# Patient Record
Sex: FEMALE | Race: WHITE
[De-identification: names, ages, dates, MRNs, and addresses within clinical notes are randomized per-mention and may not be internally consistent; named-entity substitution may affect disease eponyms.]

---

## 2017-05-23 ENCOUNTER — RESULT REVIEW (OUTPATIENT)
Age: 57
End: 2017-05-23

## 2018-05-24 ENCOUNTER — RESULT REVIEW (OUTPATIENT)
Age: 58
End: 2018-05-24

## 2019-06-03 ENCOUNTER — RECORD ABSTRACTING (OUTPATIENT)
Age: 59
End: 2019-06-03

## 2019-06-03 DIAGNOSIS — O02.1 MISSED ABORTION: ICD-10-CM

## 2019-06-03 DIAGNOSIS — Z92.89 PERSONAL HISTORY OF OTHER MEDICAL TREATMENT: ICD-10-CM

## 2019-06-03 DIAGNOSIS — Z87.891 PERSONAL HISTORY OF NICOTINE DEPENDENCE: ICD-10-CM

## 2019-06-03 DIAGNOSIS — E78.00 PURE HYPERCHOLESTEROLEMIA, UNSPECIFIED: ICD-10-CM

## 2019-06-03 DIAGNOSIS — N96 RECURRENT PREGNANCY LOSS: ICD-10-CM

## 2019-06-03 DIAGNOSIS — E16.2 HYPOGLYCEMIA, UNSPECIFIED: ICD-10-CM

## 2019-06-03 DIAGNOSIS — Z82.49 FAMILY HISTORY OF ISCHEMIC HEART DISEASE AND OTHER DISEASES OF THE CIRCULATORY SYSTEM: ICD-10-CM

## 2019-06-03 DIAGNOSIS — M85.80 OTHER SPECIFIED DISORDERS OF BONE DENSITY AND STRUCTURE, UNSPECIFIED SITE: ICD-10-CM

## 2019-06-03 LAB — CYTOLOGY CVX/VAG DOC THIN PREP: NORMAL

## 2019-06-03 RX ORDER — SIMVASTATIN 40 MG/1
40 TABLET, FILM COATED ORAL
Refills: 0 | Status: ACTIVE | COMMUNITY

## 2019-06-03 RX ORDER — CHLORPHENIRAMINE/PHENYLPROPAN 8 MG-75 MG
125 MCG CAPSULE, EXTENDED RELEASE ORAL
Refills: 0 | Status: ACTIVE | COMMUNITY

## 2019-06-03 RX ORDER — UBIDECARENONE 100 MG
100 CAPSULE ORAL
Refills: 0 | Status: ACTIVE | COMMUNITY

## 2019-06-04 ENCOUNTER — APPOINTMENT (OUTPATIENT)
Dept: OBGYN | Facility: CLINIC | Age: 59
End: 2019-06-04
Payer: COMMERCIAL

## 2019-06-04 VITALS
BODY MASS INDEX: 23 KG/M2 | HEIGHT: 62 IN | SYSTOLIC BLOOD PRESSURE: 120 MMHG | WEIGHT: 125 LBS | DIASTOLIC BLOOD PRESSURE: 70 MMHG

## 2019-06-04 DIAGNOSIS — Z12.4 ENCOUNTER FOR SCREENING FOR MALIGNANT NEOPLASM OF CERVIX: ICD-10-CM

## 2019-06-04 DIAGNOSIS — Z72.3 LACK OF PHYSICAL EXERCISE: ICD-10-CM

## 2019-06-04 DIAGNOSIS — Z01.419 ENCOUNTER FOR GYNECOLOGICAL EXAMINATION (GENERAL) (ROUTINE) W/OUT ABNORMAL FINDINGS: ICD-10-CM

## 2019-06-04 LAB
BILIRUB UR QL STRIP: NORMAL
COLLECTION METHOD: NORMAL
DATE COLLECTED: NORMAL
GLUCOSE UR-MCNC: NORMAL
HCG UR QL: 0.2 EU/DL
HEMOCCULT SP1 STL QL: NEGATIVE
HGB UR QL STRIP.AUTO: NORMAL
KETONES UR-MCNC: NORMAL
LEUKOCYTE ESTERASE UR QL STRIP: NORMAL
NITRITE UR QL STRIP: NORMAL
PH UR STRIP: 8.5
PROT UR STRIP-MCNC: NORMAL
QUALITY CONTROL: YES
SP GR UR STRIP: 1.02

## 2019-06-04 PROCEDURE — 81003 URINALYSIS AUTO W/O SCOPE: CPT | Mod: QW

## 2019-06-04 PROCEDURE — 99396 PREV VISIT EST AGE 40-64: CPT

## 2019-06-04 PROCEDURE — 82270 OCCULT BLOOD FECES: CPT

## 2019-06-04 RX ORDER — AMOXICILLIN 500 MG/1
500 CAPSULE ORAL
Qty: 21 | Refills: 0 | Status: DISCONTINUED | COMMUNITY
Start: 2019-05-20 | End: 2019-06-04

## 2019-06-04 RX ORDER — FLUTICASONE PROPIONATE 50 UG/1
50 SPRAY, METERED NASAL
Qty: 16 | Refills: 0 | Status: DISCONTINUED | COMMUNITY
Start: 2019-01-02 | End: 2019-06-04

## 2019-06-04 NOTE — HISTORY OF PRESENT ILLNESS
[1 Year Ago] : 1 year ago [Good] : being in good health [Healthy Diet] : a healthy diet [Regular Exercise] : regular exercise [Last Pap Smear ___] : last Papanicolaou cytology done [unfilled] [Performed: ___] : DEXA performed [unfilled] [Last Mammogram ___] : last mammogram done [unfilled] [Postmenopausal] : is postmenopausal [Menarche Age: ____] : age at menarche was [unfilled] [Monogamous] : is monogamous [Male ___] : [unfilled] male [Pregnancy History] : pregnancy history: [Total Preg ___] : [unfilled] [Full Term ___] : [unfilled] [Living ___] : [unfilled] [AB Spont ___] : miscarriages: [unfilled] [unknown] : the patient is unsure of the date of her LMP [Post-Menopause, No Sxs] : post-menopausal, currently without symptoms [No] : no [Last HPV Screen ___] : last human papilloma virus screening done [unfilled] [Colonoscopy Within 10 Years] : a colonoscopy within the past ten years [Mood Changes] : mood changes [Weight Concerns] : no concerns with her weight [Fever] : no fever [de-identified] : Breast ultrasound 7/20/2018 BI-RADS 1  [Diarrhea] : no diarrhea [Vomiting] : no vomiting [Nausea] : no nausea [Pelvic Pressure] : no pelvic pressure [Vaginal Discharge] : no vaginal discharge [Vaginal Bleeding] : no vaginal bleeding [Dysmenorrhea] : no dysmenorrhea [Dysuria] : no dysuria [Pain with BMs] : no pain with bowel movements [Burning] : no burning [Itching] : no itching [Mass] : no mass [Stinging] : no stinging [Lesion] : no lesion [Soreness] : no soreness [Localized Pain] : no localized pain [Discharge] : no discharge [Mass (___cm)] : no palpable mass [Diffused Pain] : no diffused pain [Skin Color Change] : no skin color change [Hot Flashes] : no hot flashes [Nipple Discharge] : no nipple discharge [Night Sweats] : no night sweats [Vaginal Itching] : no vaginal itching [Dyspareunia] : no dyspareunia [Sexually Active] : is not sexually active

## 2019-06-04 NOTE — PROCEDURE
[Cervical Pap Smear] : cervical Pap smear [Tolerated Well] : the patient tolerated the procedure well [No Complications] : there were no complications [Liquid Base] : liquid base

## 2019-06-04 NOTE — DISCUSSION/SUMMARY
[FreeTextEntry1] : 59 year old postmenopausal  5 para 3023 is here for an annual exam. \par  \par Ms. ALCANTAR's mammogram from 2018 revealed BI-RADS 2 of the right breast and BI-RADS 1 of the left breast.  Continued monthly self breast examination was advised and a referral for mammogram was provided. \par  \par The importance of weight bearing exercise and adequate daily calcium with vitamin D3 to slow the progression of bone loss was underscored. \par  \par All of her concerns were addressed, questions answered and reassurance was given.

## 2019-06-04 NOTE — PHYSICAL EXAM
[Alert] : alert [Awake] : awake [Soft] : soft [Oriented x3] : oriented to person, place, and time [No Lesions] : no genitalia lesions [Labia Majora] : labia major [Labia Minora] : labia minora [Normal Position] : in a normal position [No Bleeding] : there was no active vaginal bleeding [Normal] : clitoris [No Tenderness] : no rectal tenderness [Uterine Adnexae] : were not tender and not enlarged [Nl Sphincter Tone] : normal sphincter tone [RRR, No Murmurs] : RRR, no murmurs [Vulvar Atrophy] : vulvar atrophy [CTAB] : CTAB [Atrophy] : atrophy [Acute Distress] : no acute distress [LAD] : no lymphadenopathy [Thyroid Nodule] : no thyroid nodule [Mass] : no breast mass [Goiter] : no goiter [Nipple Discharge] : no nipple discharge [Axillary LAD] : no axillary lymphadenopathy [Distended] : not distended [Tender] : non tender [Depressed Mood] : not depressed [H/Smegaly] : no hepatosplenomegaly [Flat Affect] : affect not flat [Occult Blood] : occult blood test from digital rectal exam was negative

## 2019-06-05 LAB — HPV HIGH+LOW RISK DNA PNL CVX: NOT DETECTED

## 2019-06-10 LAB — CYTOLOGY CVX/VAG DOC THIN PREP: NORMAL

## 2020-04-04 ENCOUNTER — TRANSCRIPTION ENCOUNTER (OUTPATIENT)
Age: 60
End: 2020-04-04

## 2020-12-21 PROBLEM — Z01.419 ENCOUNTER FOR ANNUAL ROUTINE GYNECOLOGICAL EXAMINATION: Status: RESOLVED | Noted: 2019-06-04 | Resolved: 2020-12-21

## 2022-11-15 ENCOUNTER — OFFICE (OUTPATIENT)
Dept: URBAN - METROPOLITAN AREA CLINIC 102 | Facility: CLINIC | Age: 62
Setting detail: OPHTHALMOLOGY
End: 2022-11-15
Payer: COMMERCIAL

## 2022-11-15 DIAGNOSIS — H25.13: ICD-10-CM

## 2022-11-15 DIAGNOSIS — H40.033: ICD-10-CM

## 2022-11-15 DIAGNOSIS — H43.812: ICD-10-CM

## 2022-11-15 DIAGNOSIS — H90.3: ICD-10-CM

## 2022-11-15 DIAGNOSIS — H43.391: ICD-10-CM

## 2022-11-15 DIAGNOSIS — H10.45: ICD-10-CM

## 2022-11-15 PROCEDURE — 92557 COMPREHENSIVE HEARING TEST: CPT | Performed by: AUDIOLOGIST

## 2022-11-15 PROCEDURE — 92567 TYMPANOMETRY: CPT | Performed by: AUDIOLOGIST

## 2022-11-15 PROCEDURE — V5261 HEARING AID, DIGIT, BIN, BTE: HCPCS | Performed by: AUDIOLOGIST

## 2022-11-15 PROCEDURE — 92020 GONIOSCOPY: CPT | Performed by: OPHTHALMOLOGY

## 2022-11-15 PROCEDURE — 92014 COMPRE OPH EXAM EST PT 1/>: CPT | Performed by: OPHTHALMOLOGY

## 2022-11-15 ASSESSMENT — REFRACTION_CURRENTRX
OS_OVR_VA: 20/
OS_OVR_VA: 20/
OD_SPHERE: -4.00
OS_AXIS: 086
OS_ADD: +1.75
OS_SPHERE: -1.50
OS_CYLINDER: -0.50
OS_VPRISM_DIRECTION: PROGS
OD_SPHERE: -2.75
OS_ADD: +1.50
OD_CYLINDER: -0.50
OD_ADD: +1.50
OS_SPHERE: -3.50
OD_AXIS: 030
OS_CYLINDER: -1.25
OD_ADD: +1.75
OD_CYLINDER: -0.75
OS_AXIS: 105
OD_OVR_VA: 20/
OD_AXIS: 056
OD_OVR_VA: 20/
OD_VPRISM_DIRECTION: PROGS

## 2022-11-15 ASSESSMENT — AXIALLENGTH_DERIVED
OS_AL: 23.77
OD_AL: 24.2631

## 2022-11-15 ASSESSMENT — VISUAL ACUITY
OS_BCVA: 20/25
OD_BCVA: 20/25

## 2022-11-15 ASSESSMENT — KERATOMETRY
OD_K2POWER_DIOPTERS: 44.50
OS_AXISANGLE_DEGREES: 018
OD_K1POWER_DIOPTERS: 44.25
OD_AXISANGLE_DEGREES: 100
OS_K1POWER_DIOPTERS: 44.75
OS_K2POWER_DIOPTERS: 45.00

## 2022-11-15 ASSESSMENT — TONOMETRY
OS_IOP_MMHG: 14
OD_IOP_MMHG: 13

## 2022-11-15 ASSESSMENT — SPHEQUIV_DERIVED
OS_SPHEQUIV: -1.75
OD_SPHEQUIV: -2.5

## 2022-11-15 ASSESSMENT — REFRACTION_AUTOREFRACTION
OS_SPHERE: -1.25
OD_CYLINDER: -0.50
OD_SPHERE: -2.25
OS_CYLINDER: -1.00
OD_AXIS: 068
OS_AXIS: 104

## 2022-11-15 ASSESSMENT — CONFRONTATIONAL VISUAL FIELD TEST (CVF)
OS_FINDINGS: FULL
OD_FINDINGS: FULL

## 2022-12-23 ENCOUNTER — OFFICE (OUTPATIENT)
Dept: URBAN - METROPOLITAN AREA CLINIC 102 | Facility: CLINIC | Age: 62
Setting detail: OPHTHALMOLOGY
End: 2022-12-23

## 2022-12-23 DIAGNOSIS — H90.3: ICD-10-CM

## 2022-12-23 PROCEDURE — HAD HEARING AID DISPENSE: Performed by: AUDIOLOGIST

## 2023-01-06 ENCOUNTER — OFFICE (OUTPATIENT)
Dept: URBAN - METROPOLITAN AREA CLINIC 102 | Facility: CLINIC | Age: 63
Setting detail: OPHTHALMOLOGY
End: 2023-01-06

## 2023-01-06 DIAGNOSIS — H90.3: ICD-10-CM

## 2023-01-06 PROCEDURE — 92593 HEARING AID CHECK; BINAURAL: CPT | Performed by: AUDIOLOGIST

## 2023-01-09 ENCOUNTER — OFFICE (OUTPATIENT)
Dept: URBAN - METROPOLITAN AREA CLINIC 102 | Facility: CLINIC | Age: 63
Setting detail: OPHTHALMOLOGY
End: 2023-01-09

## 2023-01-09 DIAGNOSIS — H90.3: ICD-10-CM

## 2023-01-09 PROCEDURE — 92593 HEARING AID CHECK; BINAURAL: CPT | Performed by: AUDIOLOGIST

## 2023-01-17 ENCOUNTER — OFFICE (OUTPATIENT)
Dept: URBAN - METROPOLITAN AREA CLINIC 101 | Facility: CLINIC | Age: 63
Setting detail: OPHTHALMOLOGY
End: 2023-01-17

## 2023-01-17 DIAGNOSIS — H90.3: ICD-10-CM

## 2023-01-17 PROCEDURE — 92593 HEARING AID CHECK; BINAURAL: CPT | Performed by: AUDIOLOGIST

## 2023-01-31 ENCOUNTER — OFFICE (OUTPATIENT)
Dept: URBAN - METROPOLITAN AREA CLINIC 102 | Facility: CLINIC | Age: 63
Setting detail: OPHTHALMOLOGY
End: 2023-01-31

## 2023-01-31 DIAGNOSIS — H90.3: ICD-10-CM

## 2023-01-31 PROCEDURE — 92593 HEARING AID CHECK; BINAURAL: CPT | Performed by: AUDIOLOGIST

## 2023-05-19 ENCOUNTER — OFFICE (OUTPATIENT)
Dept: URBAN - METROPOLITAN AREA CLINIC 102 | Facility: CLINIC | Age: 63
Setting detail: OPHTHALMOLOGY
End: 2023-05-19

## 2023-05-19 ENCOUNTER — OFFICE (OUTPATIENT)
Dept: URBAN - METROPOLITAN AREA CLINIC 102 | Facility: CLINIC | Age: 63
Setting detail: OPHTHALMOLOGY
End: 2023-05-19
Payer: COMMERCIAL

## 2023-05-19 DIAGNOSIS — H40.033: ICD-10-CM

## 2023-05-19 DIAGNOSIS — H25.13: ICD-10-CM

## 2023-05-19 DIAGNOSIS — H43.812: ICD-10-CM

## 2023-05-19 DIAGNOSIS — H43.391: ICD-10-CM

## 2023-05-19 DIAGNOSIS — H10.45: ICD-10-CM

## 2023-05-19 DIAGNOSIS — H90.3: ICD-10-CM

## 2023-05-19 PROCEDURE — 92020 GONIOSCOPY: CPT | Performed by: OPHTHALMOLOGY

## 2023-05-19 PROCEDURE — 92014 COMPRE OPH EXAM EST PT 1/>: CPT | Performed by: OPHTHALMOLOGY

## 2023-05-19 PROCEDURE — 92083 EXTENDED VISUAL FIELD XM: CPT | Performed by: OPHTHALMOLOGY

## 2023-05-19 PROCEDURE — 92593 HEARING AID CHECK; BINAURAL: CPT | Performed by: AUDIOLOGIST

## 2023-05-19 PROCEDURE — 92133 CPTRZD OPH DX IMG PST SGM ON: CPT | Performed by: OPHTHALMOLOGY

## 2023-05-19 ASSESSMENT — REFRACTION_CURRENTRX
OD_SPHERE: -4.00
OD_SPHERE: -2.75
OD_AXIS: 056
OS_ADD: +1.75
OS_OVR_VA: 20/
OD_AXIS: 030
OS_OVR_VA: 20/
OD_VPRISM_DIRECTION: PROGS
OS_CYLINDER: -0.50
OS_SPHERE: -3.50
OD_CYLINDER: -0.75
OD_ADD: +1.75
OD_CYLINDER: -0.50
OS_ADD: +1.50
OS_VPRISM_DIRECTION: PROGS
OD_OVR_VA: 20/
OD_ADD: +1.50
OD_OVR_VA: 20/
OS_CYLINDER: -1.25
OS_AXIS: 086
OS_SPHERE: -1.50
OS_AXIS: 105

## 2023-05-19 ASSESSMENT — VISUAL ACUITY
OS_BCVA: 20/25
OD_BCVA: 20/25

## 2023-05-19 ASSESSMENT — KERATOMETRY
OD_K2POWER_DIOPTERS: 44.50
OD_K1POWER_DIOPTERS: 44.25
OS_AXISANGLE_DEGREES: 008
OS_K2POWER_DIOPTERS: 45.00
OS_K1POWER_DIOPTERS: 44.50
OD_AXISANGLE_DEGREES: 108
METHOD_AUTO_MANUAL: AUTO

## 2023-05-19 ASSESSMENT — TONOMETRY
OD_IOP_MMHG: 12
OS_IOP_MMHG: 13

## 2023-05-19 ASSESSMENT — SPHEQUIV_DERIVED
OD_SPHEQUIV: -2.125
OS_SPHEQUIV: -1.5

## 2023-05-19 ASSESSMENT — REFRACTION_AUTOREFRACTION
OD_SPHERE: -1.75
OS_CYLINDER: -1.00
OS_AXIS: 104
OS_SPHERE: -1.00
OD_AXIS: 060
OD_CYLINDER: -0.75

## 2023-05-19 ASSESSMENT — AXIALLENGTH_DERIVED
OS_AL: 23.7178
OD_AL: 24.1095

## 2023-05-19 ASSESSMENT — CONFRONTATIONAL VISUAL FIELD TEST (CVF)
OD_FINDINGS: FULL
OS_FINDINGS: FULL

## 2023-05-22 ENCOUNTER — OFFICE (OUTPATIENT)
Dept: URBAN - METROPOLITAN AREA CLINIC 102 | Facility: CLINIC | Age: 63
Setting detail: OPHTHALMOLOGY
End: 2023-05-22

## 2023-05-22 DIAGNOSIS — H90.3: ICD-10-CM

## 2023-05-22 PROCEDURE — 92593 HEARING AID CHECK; BINAURAL: CPT | Performed by: AUDIOLOGIST

## 2023-06-01 ENCOUNTER — OFFICE (OUTPATIENT)
Dept: URBAN - METROPOLITAN AREA CLINIC 102 | Facility: CLINIC | Age: 63
Setting detail: OPHTHALMOLOGY
End: 2023-06-01
Payer: COMMERCIAL

## 2023-06-01 DIAGNOSIS — H40.031: ICD-10-CM

## 2023-06-01 PROCEDURE — 66761 REVISION OF IRIS: CPT | Performed by: OPHTHALMOLOGY

## 2023-06-01 ASSESSMENT — REFRACTION_CURRENTRX
OD_OVR_VA: 20/
OS_ADD: +1.75
OD_AXIS: 056
OD_VPRISM_DIRECTION: PROGS
OS_CYLINDER: -0.50
OD_AXIS: 030
OD_SPHERE: -2.75
OD_ADD: +1.50
OS_OVR_VA: 20/
OS_VPRISM_DIRECTION: PROGS
OS_CYLINDER: -1.25
OS_SPHERE: -1.50
OS_ADD: +1.50
OS_OVR_VA: 20/
OD_OVR_VA: 20/
OS_AXIS: 105
OS_SPHERE: -3.50
OD_SPHERE: -4.00
OD_ADD: +1.75
OD_CYLINDER: -0.50
OS_AXIS: 086
OD_CYLINDER: -0.75

## 2023-06-01 ASSESSMENT — REFRACTION_AUTOREFRACTION
OS_AXIS: 105
OD_SPHERE: -1.50
OD_AXIS: 68
OS_SPHERE: -0.75
OD_CYLINDER: -1.00
OS_CYLINDER: -1.00

## 2023-06-01 ASSESSMENT — TONOMETRY
OD_IOP_MMHG: 11
OD_IOP_MMHG: 09
OS_IOP_MMHG: 14

## 2023-06-01 ASSESSMENT — SPHEQUIV_DERIVED
OS_SPHEQUIV: -1.25
OD_SPHEQUIV: -2

## 2023-06-01 ASSESSMENT — AXIALLENGTH_DERIVED
OS_AL: 23.6198
OD_AL: 24.0588

## 2023-06-01 ASSESSMENT — KERATOMETRY
OD_K1POWER_DIOPTERS: 44.25
OS_K2POWER_DIOPTERS: 45.00
OD_K2POWER_DIOPTERS: 44.50
OD_AXISANGLE_DEGREES: 101
OS_AXISANGLE_DEGREES: 11
OS_K1POWER_DIOPTERS: 44.50
METHOD_AUTO_MANUAL: AUTO

## 2023-06-01 ASSESSMENT — VISUAL ACUITY
OD_BCVA: 20/25
OS_BCVA: 20/25

## 2023-06-01 ASSESSMENT — CONFRONTATIONAL VISUAL FIELD TEST (CVF)
OS_FINDINGS: FULL
OD_FINDINGS: FULL

## 2023-06-05 ENCOUNTER — OFFICE (OUTPATIENT)
Dept: URBAN - METROPOLITAN AREA CLINIC 102 | Facility: CLINIC | Age: 63
Setting detail: OPHTHALMOLOGY
End: 2023-06-05

## 2023-06-05 DIAGNOSIS — H90.3: ICD-10-CM

## 2023-06-05 PROCEDURE — 92593 HEARING AID CHECK; BINAURAL: CPT | Performed by: AUDIOLOGIST

## 2023-06-08 ENCOUNTER — OFFICE (OUTPATIENT)
Dept: URBAN - METROPOLITAN AREA CLINIC 102 | Facility: CLINIC | Age: 63
Setting detail: OPHTHALMOLOGY
End: 2023-06-08

## 2023-06-08 DIAGNOSIS — H90.3: ICD-10-CM

## 2023-06-08 PROCEDURE — 92593 HEARING AID CHECK; BINAURAL: CPT | Performed by: AUDIOLOGIST

## 2023-06-19 ENCOUNTER — OFFICE (OUTPATIENT)
Dept: URBAN - METROPOLITAN AREA CLINIC 102 | Facility: CLINIC | Age: 63
Setting detail: OPHTHALMOLOGY
End: 2023-06-19
Payer: COMMERCIAL

## 2023-06-19 DIAGNOSIS — H25.13: ICD-10-CM

## 2023-06-19 DIAGNOSIS — H43.391: ICD-10-CM

## 2023-06-19 DIAGNOSIS — H40.033: ICD-10-CM

## 2023-06-19 DIAGNOSIS — H43.812: ICD-10-CM

## 2023-06-19 PROCEDURE — 92012 INTRM OPH EXAM EST PATIENT: CPT | Performed by: OPHTHALMOLOGY

## 2023-06-19 PROCEDURE — 92020 GONIOSCOPY: CPT | Performed by: OPHTHALMOLOGY

## 2023-06-19 ASSESSMENT — KERATOMETRY
OS_AXISANGLE_DEGREES: 018
OS_K1POWER_DIOPTERS: 44.50
OD_AXISANGLE_DEGREES: 101
OD_K1POWER_DIOPTERS: 44.25
OS_K2POWER_DIOPTERS: 45.25
OD_K2POWER_DIOPTERS: 44.50
METHOD_AUTO_MANUAL: AUTO

## 2023-06-19 ASSESSMENT — REFRACTION_AUTOREFRACTION
OS_SPHERE: -1.00
OD_CYLINDER: -0.75
OD_AXIS: 071
OD_SPHERE: -2.00
OS_AXIS: 105
OS_CYLINDER: -0.75

## 2023-06-19 ASSESSMENT — REFRACTION_CURRENTRX
OD_OVR_VA: 20/
OD_SPHERE: -2.75
OS_SPHERE: -1.50
OD_OVR_VA: 20/
OS_CYLINDER: -0.50
OD_ADD: +1.50
OS_OVR_VA: 20/
OS_VPRISM_DIRECTION: PROGS
OS_SPHERE: -3.50
OS_AXIS: 105
OD_AXIS: 030
OS_ADD: +1.50
OS_ADD: +1.75
OD_VPRISM_DIRECTION: PROGS
OD_CYLINDER: -0.75
OD_CYLINDER: -0.50
OS_OVR_VA: 20/
OS_AXIS: 086
OD_SPHERE: -4.00
OD_AXIS: 056
OD_ADD: +1.75
OS_CYLINDER: -1.25

## 2023-06-19 ASSESSMENT — AXIALLENGTH_DERIVED
OD_AL: 24.2117
OS_AL: 23.6226

## 2023-06-19 ASSESSMENT — VISUAL ACUITY
OD_BCVA: 20/20
OS_BCVA: 20/25-1

## 2023-06-19 ASSESSMENT — SPHEQUIV_DERIVED
OS_SPHEQUIV: -1.375
OD_SPHEQUIV: -2.375

## 2023-06-19 ASSESSMENT — CONFRONTATIONAL VISUAL FIELD TEST (CVF)
OD_FINDINGS: FULL
OS_FINDINGS: FULL

## 2023-06-19 ASSESSMENT — TONOMETRY
OS_IOP_MMHG: 15
OD_IOP_MMHG: 17

## 2023-07-10 ENCOUNTER — OFFICE (OUTPATIENT)
Dept: URBAN - METROPOLITAN AREA CLINIC 102 | Facility: CLINIC | Age: 63
Setting detail: OPHTHALMOLOGY
End: 2023-07-10

## 2023-07-10 DIAGNOSIS — H90.3: ICD-10-CM

## 2023-07-10 PROCEDURE — 92593 HEARING AID CHECK; BINAURAL: CPT | Performed by: AUDIOLOGIST

## 2023-09-07 ENCOUNTER — OFFICE (OUTPATIENT)
Dept: URBAN - METROPOLITAN AREA CLINIC 102 | Facility: CLINIC | Age: 63
Setting detail: OPHTHALMOLOGY
End: 2023-09-07

## 2023-09-07 DIAGNOSIS — H90.3: ICD-10-CM

## 2023-09-07 PROCEDURE — 92593 HEARING AID CHECK; BINAURAL: CPT | Performed by: AUDIOLOGIST

## 2023-11-09 ENCOUNTER — OFFICE (OUTPATIENT)
Dept: URBAN - METROPOLITAN AREA CLINIC 102 | Facility: CLINIC | Age: 63
Setting detail: OPHTHALMOLOGY
End: 2023-11-09
Payer: COMMERCIAL

## 2023-11-09 DIAGNOSIS — H40.033: ICD-10-CM

## 2023-11-09 DIAGNOSIS — H43.812: ICD-10-CM

## 2023-11-09 DIAGNOSIS — H25.13: ICD-10-CM

## 2023-11-09 DIAGNOSIS — H43.391: ICD-10-CM

## 2023-11-09 PROCEDURE — 92014 COMPRE OPH EXAM EST PT 1/>: CPT | Performed by: OPHTHALMOLOGY

## 2023-11-09 PROCEDURE — 92020 GONIOSCOPY: CPT | Performed by: OPHTHALMOLOGY

## 2023-11-09 ASSESSMENT — REFRACTION_CURRENTRX
OD_SPHERE: -2.75
OS_OVR_VA: 20/
OD_SPHERE: -4.00
OS_ADD: +1.50
OD_AXIS: 030
OD_OVR_VA: 20/
OS_ADD: +1.75
OS_OVR_VA: 20/
OD_CYLINDER: -0.75
OD_ADD: +1.75
OD_ADD: +1.50
OS_SPHERE: -1.50
OD_AXIS: 056
OD_VPRISM_DIRECTION: PROGS
OS_SPHERE: -3.50
OS_VPRISM_DIRECTION: PROGS
OS_AXIS: 086
OS_AXIS: 105
OD_CYLINDER: -0.50
OD_OVR_VA: 20/
OS_CYLINDER: -0.50
OS_CYLINDER: -1.25

## 2023-11-09 ASSESSMENT — CONFRONTATIONAL VISUAL FIELD TEST (CVF)
OS_FINDINGS: FULL
OD_FINDINGS: FULL

## 2023-11-09 ASSESSMENT — SPHEQUIV_DERIVED
OS_SPHEQUIV: -1.5
OD_SPHEQUIV: -2.125

## 2023-11-09 ASSESSMENT — REFRACTION_AUTOREFRACTION
OS_SPHERE: -1.00
OS_CYLINDER: -1.00
OD_AXIS: 65
OS_AXIS: 110
OD_CYLINDER: -0.75
OD_SPHERE: -1.75

## 2023-12-07 ENCOUNTER — OFFICE (OUTPATIENT)
Dept: URBAN - METROPOLITAN AREA CLINIC 102 | Facility: CLINIC | Age: 63
Setting detail: OPHTHALMOLOGY
End: 2023-12-07

## 2023-12-07 DIAGNOSIS — H90.3: ICD-10-CM

## 2023-12-07 PROCEDURE — 92593 HEARING AID CHECK; BINAURAL: CPT | Performed by: AUDIOLOGIST

## 2024-05-13 ENCOUNTER — OFFICE (OUTPATIENT)
Dept: URBAN - METROPOLITAN AREA CLINIC 102 | Facility: CLINIC | Age: 64
Setting detail: OPHTHALMOLOGY
End: 2024-05-13
Payer: COMMERCIAL

## 2024-05-13 DIAGNOSIS — H43.391: ICD-10-CM

## 2024-05-13 DIAGNOSIS — H90.3: ICD-10-CM

## 2024-05-13 DIAGNOSIS — H43.812: ICD-10-CM

## 2024-05-13 DIAGNOSIS — H25.13: ICD-10-CM

## 2024-05-13 DIAGNOSIS — H40.033: ICD-10-CM

## 2024-05-13 PROBLEM — H40.031 NARROW ANGLE GLAUCOMA SUSPECT;  ,, BOTH EYES: Status: ACTIVE | Noted: 2024-05-13

## 2024-05-13 PROCEDURE — 92557 COMPREHENSIVE HEARING TEST: CPT | Performed by: AUDIOLOGIST

## 2024-05-13 PROCEDURE — 92020 GONIOSCOPY: CPT | Performed by: OPHTHALMOLOGY

## 2024-05-13 PROCEDURE — 92014 COMPRE OPH EXAM EST PT 1/>: CPT | Performed by: OPHTHALMOLOGY

## 2024-05-13 PROCEDURE — 92567 TYMPANOMETRY: CPT | Performed by: AUDIOLOGIST

## 2024-05-13 ASSESSMENT — CONFRONTATIONAL VISUAL FIELD TEST (CVF)
OD_FINDINGS: FULL
OS_FINDINGS: FULL

## 2024-06-10 ENCOUNTER — OFFICE (OUTPATIENT)
Dept: URBAN - METROPOLITAN AREA CLINIC 102 | Facility: CLINIC | Age: 64
Setting detail: OPHTHALMOLOGY
End: 2024-06-10
Payer: COMMERCIAL

## 2024-06-10 DIAGNOSIS — H40.032: ICD-10-CM

## 2024-06-10 PROCEDURE — 66761 REVISION OF IRIS: CPT | Mod: LT | Performed by: OPHTHALMOLOGY

## 2024-06-10 ASSESSMENT — CONFRONTATIONAL VISUAL FIELD TEST (CVF)
OS_FINDINGS: FULL
OD_FINDINGS: FULL

## 2024-06-25 ENCOUNTER — OFFICE (OUTPATIENT)
Dept: URBAN - METROPOLITAN AREA CLINIC 102 | Facility: CLINIC | Age: 64
Setting detail: OPHTHALMOLOGY
End: 2024-06-25
Payer: COMMERCIAL

## 2024-06-25 DIAGNOSIS — H43.812: ICD-10-CM

## 2024-06-25 DIAGNOSIS — H40.033: ICD-10-CM

## 2024-06-25 DIAGNOSIS — H43.391: ICD-10-CM

## 2024-06-25 DIAGNOSIS — H25.13: ICD-10-CM

## 2024-06-25 PROCEDURE — 92014 COMPRE OPH EXAM EST PT 1/>: CPT | Performed by: OPHTHALMOLOGY

## 2024-06-25 PROCEDURE — 92020 GONIOSCOPY: CPT | Performed by: OPHTHALMOLOGY

## 2024-06-25 ASSESSMENT — CONFRONTATIONAL VISUAL FIELD TEST (CVF)
OS_FINDINGS: FULL
OD_FINDINGS: FULL

## 2024-08-13 ENCOUNTER — OFFICE (OUTPATIENT)
Dept: URBAN - METROPOLITAN AREA CLINIC 102 | Facility: CLINIC | Age: 64
Setting detail: OPHTHALMOLOGY
End: 2024-08-13

## 2024-08-13 DIAGNOSIS — H90.3: ICD-10-CM

## 2024-08-13 PROCEDURE — 92593 HEARING AID CHECK; BINAURAL: CPT | Performed by: AUDIOLOGIST

## 2024-08-23 ENCOUNTER — OFFICE (OUTPATIENT)
Dept: URBAN - METROPOLITAN AREA CLINIC 102 | Facility: CLINIC | Age: 64
Setting detail: OPHTHALMOLOGY
End: 2024-08-23

## 2024-08-23 DIAGNOSIS — H90.3: ICD-10-CM

## 2024-08-23 PROCEDURE — 92593 HEARING AID CHECK; BINAURAL: CPT | Performed by: AUDIOLOGIST

## 2024-09-05 ENCOUNTER — OFFICE (OUTPATIENT)
Dept: URBAN - METROPOLITAN AREA CLINIC 102 | Facility: CLINIC | Age: 64
Setting detail: OPHTHALMOLOGY
End: 2024-09-05

## 2024-09-05 DIAGNOSIS — H90.3: ICD-10-CM

## 2024-09-05 PROCEDURE — 92593 HEARING AID CHECK; BINAURAL: CPT | Performed by: AUDIOLOGIST

## 2024-12-30 ENCOUNTER — OFFICE (OUTPATIENT)
Dept: URBAN - METROPOLITAN AREA CLINIC 102 | Facility: CLINIC | Age: 64
Setting detail: OPHTHALMOLOGY
End: 2024-12-30
Payer: COMMERCIAL

## 2024-12-30 DIAGNOSIS — H25.13: ICD-10-CM

## 2024-12-30 DIAGNOSIS — H40.033: ICD-10-CM

## 2024-12-30 DIAGNOSIS — H43.812: ICD-10-CM

## 2024-12-30 DIAGNOSIS — H43.391: ICD-10-CM

## 2024-12-30 PROCEDURE — 92020 GONIOSCOPY: CPT | Performed by: OPHTHALMOLOGY

## 2024-12-30 PROCEDURE — 92014 COMPRE OPH EXAM EST PT 1/>: CPT | Performed by: OPHTHALMOLOGY

## 2024-12-30 ASSESSMENT — TONOMETRY
OS_IOP_MMHG: 13
OD_IOP_MMHG: 10

## 2024-12-30 ASSESSMENT — KERATOMETRY
OS_K1POWER_DIOPTERS: 44.25
OD_K2POWER_DIOPTERS: 44.50
OS_AXISANGLE_DEGREES: 019
OD_K1POWER_DIOPTERS: 44.50
OS_K2POWER_DIOPTERS: 45.00
OD_AXISANGLE_DEGREES: 090
METHOD_AUTO_MANUAL: AUTO

## 2024-12-30 ASSESSMENT — REFRACTION_CURRENTRX
OD_ADD: +1.75
OD_ADD: +1.50
OS_AXIS: 105
OS_OVR_VA: 20/
OS_AXIS: 086
OS_CYLINDER: -0.50
OS_SPHERE: -1.50
OD_VPRISM_DIRECTION: PROGS
OD_CYLINDER: -0.75
OD_AXIS: 056
OS_OVR_VA: 20/
OD_OVR_VA: 20/
OD_AXIS: 030
OD_SPHERE: -4.00
OS_ADD: +1.75
OS_VPRISM_DIRECTION: PROGS
OD_CYLINDER: -0.50
OD_OVR_VA: 20/
OS_ADD: +1.50
OS_CYLINDER: -1.25
OD_SPHERE: -2.75
OS_SPHERE: -3.50

## 2024-12-30 ASSESSMENT — CONFRONTATIONAL VISUAL FIELD TEST (CVF)
OS_FINDINGS: FULL
OD_FINDINGS: FULL

## 2024-12-30 ASSESSMENT — REFRACTION_AUTOREFRACTION
OS_SPHERE: -0.75
OD_CYLINDER: -1.25
OS_AXIS: 108
OD_SPHERE: -1.50
OD_AXIS: 064
OS_CYLINDER: -1.00

## 2024-12-30 ASSESSMENT — VISUAL ACUITY
OD_BCVA: 20/20
OS_BCVA: 20/20

## 2025-07-01 ENCOUNTER — OFFICE (OUTPATIENT)
Dept: URBAN - METROPOLITAN AREA CLINIC 102 | Facility: CLINIC | Age: 65
Setting detail: OPHTHALMOLOGY
End: 2025-07-01
Payer: MEDICARE

## 2025-07-01 DIAGNOSIS — H43.812: ICD-10-CM

## 2025-07-01 DIAGNOSIS — H43.391: ICD-10-CM

## 2025-07-01 DIAGNOSIS — H25.13: ICD-10-CM

## 2025-07-01 DIAGNOSIS — H40.033: ICD-10-CM

## 2025-07-01 PROCEDURE — 92020 GONIOSCOPY: CPT | Performed by: OPHTHALMOLOGY

## 2025-07-01 PROCEDURE — 92133 CPTRZD OPH DX IMG PST SGM ON: CPT | Performed by: OPHTHALMOLOGY

## 2025-07-01 PROCEDURE — 92083 EXTENDED VISUAL FIELD XM: CPT | Performed by: OPHTHALMOLOGY

## 2025-07-01 PROCEDURE — 92014 COMPRE OPH EXAM EST PT 1/>: CPT | Performed by: OPHTHALMOLOGY

## 2025-07-01 ASSESSMENT — REFRACTION_CURRENTRX
OS_ADD: +1.75
OS_SPHERE: -1.00
OD_ADD: +1.75
OS_OVR_VA: 20/
OS_ADD: +2.25
OD_ADD: +1.50
OS_VPRISM_DIRECTION: PROGS
OS_CYLINDER: -0.50
OS_SPHERE: -1.50
OS_OVR_VA: 20/
OD_OVR_VA: 20/
OD_OVR_VA: 20/
OD_AXIS: 056
OD_OVR_VA: 20/
OD_AXIS: 067
OD_SPHERE: -1.75
OD_CYLINDER: -0.50
OS_VPRISM_DIRECTION: PROGS
OD_SPHERE: -2.75
OS_CYLINDER: -1.25
OD_ADD: +2.25
OD_CYLINDER: -0.75
OD_CYLINDER: -1.50
OS_CYLINDER: -0.75
OD_AXIS: 030
OS_ADD: +1.50
OS_AXIS: 086
OD_VPRISM_DIRECTION: PROGS
OS_AXIS: 086
OS_OVR_VA: 20/
OS_AXIS: 105
OD_VPRISM_DIRECTION: PROGS
OD_SPHERE: -4.00
OS_SPHERE: -3.50

## 2025-07-01 ASSESSMENT — KERATOMETRY
OD_AXISANGLE_DEGREES: 100
OS_K1POWER_DIOPTERS: 44.25
OS_K2POWER_DIOPTERS: 45.00
OD_K2POWER_DIOPTERS: 44.50
OD_K1POWER_DIOPTERS: 44.25
METHOD_AUTO_MANUAL: AUTO
OS_AXISANGLE_DEGREES: 019

## 2025-07-01 ASSESSMENT — TONOMETRY
OS_IOP_MMHG: 15
OD_IOP_MMHG: 15

## 2025-07-01 ASSESSMENT — CONFRONTATIONAL VISUAL FIELD TEST (CVF)
OS_FINDINGS: FULL
OD_FINDINGS: FULL

## 2025-07-01 ASSESSMENT — REFRACTION_AUTOREFRACTION
OD_CYLINDER: -0.75
OS_CYLINDER: -1.00
OD_AXIS: 068
OS_AXIS: 107
OD_SPHERE: -1.50
OS_SPHERE: -0.50

## 2025-07-01 ASSESSMENT — VISUAL ACUITY
OD_BCVA: 20/20
OS_BCVA: 20/20